# Patient Record
Sex: MALE | URBAN - METROPOLITAN AREA
[De-identification: names, ages, dates, MRNs, and addresses within clinical notes are randomized per-mention and may not be internally consistent; named-entity substitution may affect disease eponyms.]

---

## 2021-08-24 ENCOUNTER — EMERGENCY (EMERGENCY)
Facility: HOSPITAL | Age: 21
LOS: 1 days | Discharge: HOME | End: 2021-08-24
Attending: EMERGENCY MEDICINE | Admitting: EMERGENCY MEDICINE
Payer: MEDICAID

## 2021-08-24 VITALS
HEART RATE: 83 BPM | SYSTOLIC BLOOD PRESSURE: 110 MMHG | OXYGEN SATURATION: 100 % | TEMPERATURE: 98 F | RESPIRATION RATE: 18 BRPM | DIASTOLIC BLOOD PRESSURE: 63 MMHG

## 2021-08-24 PROCEDURE — 99283 EMERGENCY DEPT VISIT LOW MDM: CPT

## 2021-08-24 RX ORDER — ACETAMINOPHEN 500 MG
650 TABLET ORAL ONCE
Refills: 0 | Status: COMPLETED | OUTPATIENT
Start: 2021-08-24 | End: 2021-08-24

## 2021-08-24 RX ADMIN — Medication 650 MILLIGRAM(S): at 19:54

## 2021-08-24 NOTE — ED PROVIDER NOTE - PHYSICAL EXAMINATION
VS:  unremarkable    	GEN - NAD;   well appearing;   A+O x3   	HEAD - NC/AT     	ENT - PEERL, EOMI, mucous membranes    moist , no discharge      	NECK: Neck supple, non-tender without lymphadenopathy, no masses, no JVD  	PULM - CTA b/l,  symmetric breath sounds  	COR -  normal heart sounds    	ABD - , ND, NT, soft,  	BACK - no CVA tenderness, nontender spine     	EXTREMS - no edema, no deformity, warm and well perfused  except L knee -  L  Knee with mild swelling and ecchymosis.  Pain with flexion.  Not particularly tender L knee.  Extensor mechanism intact.  quad and patellar tendon intact.  Perhaps mild L knee effusion.  L ankle not particularly tender.  Distal foot NVT intact.  Rpt xrays unlikely to be helpful.   	SKIN - no rash    or bruising      NEUROLOGIC - alert, face symmetric, speech fluent, sensation nl, motor no focal deficit.

## 2021-08-24 NOTE — ED PROVIDER NOTE - NSFOLLOWUPINSTRUCTIONS_ED_ALL_ED_FT
FOLLOW UP WITH YOUR  DOCTOR WITHIN 1 WEEK  BRING THE COPIES OF YOUR RESULTS WITH YOU (PROVIDED)  CAN TAKE TYLENOL 650MG ORALLY EVERY 6 HOURS FOR PAIN OR FEVER.  IBUPROFEN 400MG ORALLY EVERY 6 HOURS FOR PAIN OR FEVER.    CAN TAKE TYLENOL AND IBUPROFEN AT THE SAME TIME  RETURN TO ED FOR NEW OR WORSENING SYMPTOMS.    Follow up with orthopedics within 1 week - see list.    Use crutches or cane to reduce weight bearing LLE.  Continue L ankle splint and maintain L knee immobilizer on until seen by ortho.

## 2021-08-24 NOTE — ED PROVIDER NOTE - PATIENT PORTAL LINK FT
You can access the FollowMyHealth Patient Portal offered by [facility] by registering at the following website: [email or web address]. By joining [facility]’s FollowMyHealth portal, you will also be able to view your health information using other applications (apps) compatible with our system.

## 2021-08-24 NOTE — ED ADULT TRIAGE NOTE - CHIEF COMPLAINT QUOTE
Pt injuried his left ankle and knee last thursday and was seen at [facility]--pt had x-rays done at that time which was negative--pt wants MRI because when he stands he gets a lot of pain

## 2021-08-24 NOTE — ED PROVIDER NOTE - OBJECTIVE STATEMENT
21M p/w L ankle and knee injury 5 days ago was seen at OSH, was told no fractures.  Here req MRI.  Was playing basket ball rolled ankle initially felt force into his knee, felt his knee pop out then back in.  L knee pain when standing on it.  Hurts to walk on it.  Ankle feeling better in splint.  PMHX none  PSHX - none  meds - ibuprofen (last at 4pm)  All nka.  No T.  Was using crutches.  . L  Knee with mild swelling and ecchymosis.  Pain with flexion.  Not particularly tender L knee.  Extensor mechanism intact.  quad and patellar tendon intact.  Perhaps mild L knee effusion.  L ankle not particularly tender.  Distal foot NVT intact.  Plan rx knee immobilizer, cane, add tylenol for supplemental pain control.  Will use discharge lounge to help pt obtain ortho appt.    VS:  unremarkable    GEN - NAD;   well appearing;   A+O x3   HEAD - NC/AT     ENT - PEERL, EOMI, mucous membranes    moist , no discharge      NECK: Neck supple, non-tender without lymphadenopathy, no masses, no JVD  PULM - CTA b/l,  symmetric breath sounds  COR -  normal heart sounds    ABD - , ND, NT, soft,  BACK - no CVA tenderness, nontender spine     EXTREMS - no edema, no deformity, warm and well perfused  except L knee -  L  Knee with mild swelling and ecchymosis.  Pain with flexion.  Not particularly tender L knee.  Extensor mechanism intact.  quad and patellar tendon intact.  Perhaps mild L knee effusion.  L ankle not particularly tender.  Distal foot NVT intact.  Rpt xrays unlikely to be helpful.   SKIN - no rash    or bruising      NEUROLOGIC - alert, face symmetric, speech fluent, sensation nl, motor no focal deficit.

## 2021-08-25 ENCOUNTER — APPOINTMENT (OUTPATIENT)
Dept: ORTHOPEDIC SURGERY | Facility: CLINIC | Age: 21
End: 2021-08-25

## 2021-08-25 PROBLEM — Z00.00 ENCOUNTER FOR PREVENTIVE HEALTH EXAMINATION: Status: ACTIVE | Noted: 2021-08-25

## 2021-09-01 ENCOUNTER — APPOINTMENT (OUTPATIENT)
Dept: ORTHOPEDIC SURGERY | Facility: CLINIC | Age: 21
End: 2021-09-01

## 2021-09-08 ENCOUNTER — APPOINTMENT (OUTPATIENT)
Dept: ORTHOPEDIC SURGERY | Facility: HOSPITAL | Age: 21
End: 2021-09-08

## 2021-09-13 PROBLEM — Z78.9 OTHER SPECIFIED HEALTH STATUS: Chronic | Status: ACTIVE | Noted: 2021-08-25

## 2021-09-30 ENCOUNTER — NON-APPOINTMENT (OUTPATIENT)
Age: 21
End: 2021-09-30

## 2021-10-01 ENCOUNTER — APPOINTMENT (OUTPATIENT)
Dept: ORTHOPEDIC SURGERY | Facility: CLINIC | Age: 21
End: 2021-10-01

## 2021-10-05 ENCOUNTER — APPOINTMENT (OUTPATIENT)
Dept: ORTHOPEDIC SURGERY | Facility: CLINIC | Age: 21
End: 2021-10-05
Payer: MEDICAID

## 2021-10-05 ENCOUNTER — FORM ENCOUNTER (OUTPATIENT)
Age: 21
End: 2021-10-05

## 2021-10-05 VITALS — WEIGHT: 205 LBS | BODY MASS INDEX: 27.77 KG/M2 | HEIGHT: 72 IN

## 2021-10-05 PROCEDURE — 99204 OFFICE O/P NEW MOD 45 MIN: CPT

## 2021-10-05 PROCEDURE — 99072 ADDL SUPL MATRL&STAF TM PHE: CPT

## 2021-10-05 PROCEDURE — 73564 X-RAY EXAM KNEE 4 OR MORE: CPT | Mod: LT

## 2021-10-05 RX ORDER — DICLOFENAC SODIUM 50 MG/1
50 TABLET, DELAYED RELEASE ORAL
Qty: 30 | Refills: 0 | Status: ACTIVE | COMMUNITY
Start: 2021-10-05 | End: 1900-01-01

## 2021-10-05 NOTE — DISCUSSION/SUMMARY
[de-identified] : Left knee internal derangement and decreased range of motion\par \par I discussed my findings on history, exam and radiology.\par \par I reviewed the anatomy and function of the periarticular muscles and tendons, patella, ligaments, menisci and cartilage of the knee using models, images and diagrams. Given the current findings for the patient, I recommend proceeding with advanced imaging to better characterize and diagnose the problem to aid patient care, management and treatment guidance as I suspect an internal injury to the knee not diagnosed on non-diagnostic plain radiographs causing the patients symptoms and physical examination to help provide surgical management planning.\par \par Therefore given the patients continued symptoms despite [ non-operative management ] with continued pain affecting ADLs and inability to do activities limiting quality of life as well as locking and instability significant for patient falls potentially placing the patient at increased risk for exacerbating the injury or causing further injury to the knee, an examination and history consistent with injury to an internal knee derangement and a non-diagnostic radiograph I recommend obtaining an MRI to assess the knee's internal structures for preoperative planning. I discussed with the patient that I am ordering an MRI to assess the soft tissue structures in the joint such as ligaments and tendons, as well as to assess the cartilage and menisci as well as for any bony edema. The test will need insurance approval and will take place at a [facility] facility or outside facility. If the patient elects to obtain the MRI from an outside facility, the patient understands they have been instructed to bring in both the final radiologist read and a CD/DVD with the MRI images to allow review of the imaging test by myself during the follow up visit. I do not recommend obtaining an open standing MRI as the quality of the images is subpar and makes it difficult to diagnose intra-articular derangements and guide care discussion and decision making.\par The patient was prescribed Diclofenac PO non-steroidal anti-inflammatory medication. 50mg tablets twice daily to be taken for at least 1-2 weeks in a row and then PRN afterwards. Risks and benefits were discussed and include but not limited to renal damage and GI ulceration and bleeding.  They were advised to take with food to limit stomach upset as well as warned to stop the medication if worsening gastric pain or dizziness or other side effects. Also to immediately stop the medication and seek appropriate medical attention if any severe stomach ache, gastritis, black/red vomit, black/red stools or any other medical concern.\par \par Physical therapy to restore knee range of motion\par \par The patient verifies their understanding the the visit, diagnosis and plan. They agree with the treatment plan and will contact the office with any questions or problems.\par \par FU after MRI is obtained

## 2021-10-05 NOTE — HISTORY OF PRESENT ILLNESS
[de-identified] : CC Left knee\par \par HPI 20 yo male presents with acute onset of 6 weeks of flexion related pain in the deep Left knee after dislocating the left knee. The pain is better, and rated a 6 out of 10, described as severe pain, [without radiation]. rest makes the pain better and motion makes the pain worse. The patient reports associated symptoms of loss of motion. The patient + pain at night affecting sleep, and - similar pain previously.\par \par The patient has tried the following treatments:\par Activity modification	+ NWB\par Ice/Compression  	[-]\par Braces    		+ KI 6 weeks\par Nsaids    		[-]\par Physical Therapy 	[-]\par Cortisone Injection	[-]\par Visco Injection		[-]\par Zilretta			[-]\par Arthroscopy		[-]\par \par Review of Systems is positive for the above musculoskeletal symptoms and is otherwise non-contributory for general, constitutional, psychiatric, neurologic, HEENT, cardiac, respiratory, gastrointestinal, reproductive, lymphatic, and dermatologic complaints.\par \par Consult by  [ ]\par \par

## 2021-10-05 NOTE — PHYSICAL EXAM
[de-identified] : Physical Examination\par General: well nourished, in no acute distress, alert and oriented to person, place and time\par Psychiatric: normal mood and affect, no abnormal movements or speech patterns\par Eyes: vision intact - glasses\par \par Musculoskeletal Examination\par Ambulation	+ antalgic gait, + assistive devices\par \par Knee			Right			Left\par General\par      Swelling/Deformity	normal			normal	\par      Skin			normal			normal\par      Erythema		-			-\par      Standing Alignment	neutral			neutral\par      Effusion		none			none\par Range of Motion\par      Hip			full painless ROM		full painless ROM\par      Knee Flexion		130			30\par      Knee Extension	0			5\par Patella\par      J Sign		-			-\par      Quad Medial/Lateral	1/1 1/1\par      Apprehension		-			+\par      Corey's		-			+\par      Grind Sign		-			+\par      Crepitus		-			+\par Palpation\par      Medial Joint Line	-			+\par      Medial Fem Condyle	-			-\par      Lateral Joint Line	-			+\par      Quad Tendon		-			-\par      Patella Tendon	-			-\par      Medial Patella		-			-\par      Lateral Patella 	-			-\par      Posterior Knee	-			-\par Ligamentous\par      Varus @ 0° / 30°	-/-			-/-\par      Valgus @ 0° / 30°	-/-			-/-\par      Lachman		-			-&\par      Pivot Shift		-			-&\par      Anterior Drawer	-			-&\par      Posterior Drawer	-			-&\par Meniscus\par      Yvonne		-			-&\par      Flexion Pinch		-			-&\par Strength Examination/Atrophy\par      Hip Flexors 		5+			5+\par      Quadriceps		5+			5+&\par      Hamstring		5+			5+&\par      Tibialis Anterior	5+			5+\par      Achilles/Soleus	5+			5+\par Sensation\par      Deep Peroneal	normal			normal\par      Superficial Peroneal 	normal			normal\par      Sural  		normal			normal\par      Posterior Tibial 	normal			normal\par      Saphneous 		normal			normal\par Pulses\par      DP			2+			2+\par  [de-identified] : 4 views of the affected Left knee (standing AP, flexing standing AP, 30degree flexed lateral, sunrise view)\par were ordered, obtained and evaluated by myself today and\par demonstrate:\par There is trace narrowing\par Trace osteophytic lipping\par Trace suprapatellar effusion\par Trace patellofemoral joint space loss without evidence of tilt [or] subluxation on sunrise view\par Normal soft tissue density\par Otherwise normal osseous bone structure without fracture or dislocation

## 2021-10-11 ENCOUNTER — NON-APPOINTMENT (OUTPATIENT)
Age: 21
End: 2021-10-11

## 2021-10-23 ENCOUNTER — OUTPATIENT (OUTPATIENT)
Dept: OUTPATIENT SERVICES | Facility: HOSPITAL | Age: 21
LOS: 1 days | End: 2021-10-23
Payer: COMMERCIAL

## 2021-10-23 ENCOUNTER — APPOINTMENT (OUTPATIENT)
Dept: MRI IMAGING | Facility: CLINIC | Age: 21
End: 2021-10-23
Payer: MEDICAID

## 2021-10-23 DIAGNOSIS — M23.92 UNSPECIFIED INTERNAL DERANGEMENT OF LEFT KNEE: ICD-10-CM

## 2021-10-23 PROCEDURE — 73721 MRI JNT OF LWR EXTRE W/O DYE: CPT | Mod: 26,LT

## 2021-10-23 PROCEDURE — 73721 MRI JNT OF LWR EXTRE W/O DYE: CPT

## 2021-11-03 ENCOUNTER — APPOINTMENT (OUTPATIENT)
Dept: ORTHOPEDIC SURGERY | Facility: CLINIC | Age: 21
End: 2021-11-03
Payer: MEDICAID

## 2021-11-03 DIAGNOSIS — M25.362 OTHER INSTABILITY, LEFT KNEE: ICD-10-CM

## 2021-11-03 DIAGNOSIS — S83.412A SPRAIN OF MEDIAL COLLATERAL LIGAMENT OF LEFT KNEE, INITIAL ENCOUNTER: ICD-10-CM

## 2021-11-03 PROCEDURE — 99443: CPT

## 2021-11-03 NOTE — HISTORY OF PRESENT ILLNESS
[de-identified] : This visit, 11/03/2021, was completed via telehealth with realtime 2-way Audio and Visual HIPPA compliant technology.\par \par The patient PHYLICIA SALDANA is located at [address]\[place] [address] today during telehealth visit.\par The provider HEIDI ANAYA is located at [address] [place] [address].\par \par The patient provided verbal consent today to proceed with telehealth audio visit.\par \par CC Left knee\par \par HPI 22 yo male presents for mri fu of acute onset of 6 weeks of flexion related pain in the deep Left knee after dislocating the left knee. The pain is better, and rated a 6 out of 10, described as severe pain, [without radiation]. rest makes the pain better and motion makes the pain worse. The patient reports associated symptoms of loss of motion. The patient + pain at night affecting sleep, and - similar pain previously.\par \par The patient has tried the following treatments:\par Activity modification	+ NWB\par Ice/Compression  	[-]\par Braces    		+ KI 6 weeks\par Nsaids    		[-]\par Physical Therapy 	+ several sessions\par Cortisone Injection	[-]\par Visco Injection		[-]\par Zilretta			[-]\par Arthroscopy		[-]\par \par in pt, feeling better improved rom\par \par Review of Systems is positive for the above musculoskeletal symptoms and is otherwise non-contributory for general, constitutional, psychiatric, neurologic, HEENT, cardiac, respiratory, gastrointestinal, reproductive, lymphatic, and dermatologic complaints.\par \par Consult by  [ ]\par \par

## 2021-11-03 NOTE — PHYSICAL EXAM
[de-identified] : Physical Examination\par General: well nourished, in no acute distress, alert and oriented to person, place and time\par Psychiatric: normal mood and affect, no abnormal movements or speech patterns\par Eyes: vision intact - glasses\par \par Musculoskeletal Examination\par Ambulation	+ antalgic gait, + assistive devices\par \par Knee			Right			Left\par \par Range of Motion\par      Hip			full painless ROM		full painless ROM\par      Knee Flexion		130			600\par      Knee Extension	0			5\par \par Palpation\par      Medial Joint Line	-			-\par      Medial Fem Condyle	-			-\par      Lateral Joint Line	-			-\par      Quad Tendon		-			-\par      Patella Tendon	-			-\par      Medial Patella		-			-\par      Lateral Patella 	-			-\par      Posterior Knee	-			-\par \par Strength Examination/Atrophy\par      Hip Flexors 		5+			5+\par      Quadriceps		5+			5-\par      Hamstring		5+			5-\par      Tibialis Anterior	5+			5+\par      Achilles/Soleus	5+			5+\par Sensation\par      Deep Peroneal	normal			normal\par      Superficial Peroneal 	normal			normal\par      Sural  		normal			normal\par      Posterior Tibial 	normal			normal\par      Saphneous 		normal			normal\par Pulses\par      DP			2+			2+\par  [de-identified] : 4 views of the affected Left knee (standing AP, flexing standing AP, 30degree flexed lateral, sunrise view)\par 10-5-21\par demonstrate:\par There is trace narrowing\par Trace osteophytic lipping\par Trace suprapatellar effusion\par Trace patellofemoral joint space loss without evidence of tilt [or] subluxation on sunrise view\par Normal soft tissue density\par Otherwise normal osseous bone structure without fracture or dislocation\par \par \par MRI Left knee from [facility] on 10-25-21\par My impression of the images:\par Quality of the MRI is good\par Medial Meniscus ok\par Lateral Meniscus ok\par There is no chondral loss in the tri compartments\par There is bone marrow edema in the medial femoral condyle compartments\par LCL [Is] intact\par MCL [Is] intact, slightly thickened\par ACL [Is] intact\par PCL [Is] intact \par Quadriceps Tendon [Is] intact\par Patella Tendon [Is] intact\par \par The Final Radiologist Impression:\par CRUCIATE AND COLLATERAL LIGAMENTS: The ACL, PCL, and LCL complex are intact. Mild edema and thickening of the anterior edge of the proximal MCL. In irregular appearance of the medial retinacular suggesting a subacute sprain with scar remodeling.\par \par MEDIAL COMPARTMENT: No medial meniscal tear. Articular cartilage is maintained.\par \par LATERAL COMPARTMENT: No lateral meniscal tear. Articular cartilage is maintained.\par \par PATELLOFEMORAL COMPARTMENT: Articular cartilage is maintained. TT-TG = 1.1 cm\par \par EXTENSOR MECHANISM: Within normal limits\par \par SYNOVIUM/ JOINT FLUID: Small knee joint effusion. No popliteal cyst.\par \par BONE MARROW: Small area of edema within the lateral femoral condyle.\par \par MUSCLES: Mildly increased signal/edema within the gastrocnemius musculature.\par \par NEUROVASCULAR STRUCTURES: Normal in course and caliber\par \par SUBCUTANEOUS SOFT TISSUES: No significant soft tissue edema\par \par IMPRESSION:\par 1. Low-grade sprain of the proximal MCL.\par 2. Mild marrow contusion at the lateral femoral condyle. Medial retinacular injury with mild scar remodeling. These findings may reflect a recent transient dislocation of the patella. Correlate clinically\par 3. Small knee joint effusion.\par 4. Mild calf muscle edema may reflect a low-grade strain.

## 2021-11-03 NOTE — DISCUSSION/SUMMARY
[de-identified] : Left knee patella instability and mcl strain\par \par I discussed my findings on history, exam and radiology.\par \par op vs nonop discussed at length, given improvement rec cont non-op\par \par prn prescribed Diclofenac PO non-steroidal anti-inflammatory medication. 50mg tablets twice daily to be taken for at least 1-2 weeks in a row and then PRN afterwards. Risks and benefits were discussed and include but not limited to renal damage and GI ulceration and bleeding.  They were advised to take with food to limit stomach upset as well as warned to stop the medication if worsening gastric pain or dizziness or other side effects. Also to immediately stop the medication and seek appropriate medical attention if any severe stomach ache, gastritis, black/red vomit, black/red stools or any other medical concern.\par \par Physical therapy to restore knee range of motion\par \par The patient verifies their understanding the the visit, diagnosis and plan. They agree with the treatment plan and will contact the office with any questions or problems.\par \par FU 2 mo